# Patient Record
Sex: FEMALE | URBAN - METROPOLITAN AREA
[De-identification: names, ages, dates, MRNs, and addresses within clinical notes are randomized per-mention and may not be internally consistent; named-entity substitution may affect disease eponyms.]

---

## 2018-05-01 ENCOUNTER — OUTPATIENT (OUTPATIENT)
Dept: OUTPATIENT SERVICES | Facility: HOSPITAL | Age: 73
LOS: 1 days | End: 2018-05-01
Payer: MEDICAID

## 2018-05-01 PROCEDURE — G9001: CPT

## 2018-05-16 ENCOUNTER — EMERGENCY (EMERGENCY)
Facility: HOSPITAL | Age: 73
LOS: 1 days | Discharge: HOME | End: 2018-05-16
Attending: EMERGENCY MEDICINE
Payer: MEDICARE

## 2018-05-16 VITALS
WEIGHT: 149.91 LBS | OXYGEN SATURATION: 98 % | SYSTOLIC BLOOD PRESSURE: 149 MMHG | HEART RATE: 79 BPM | TEMPERATURE: 98 F | DIASTOLIC BLOOD PRESSURE: 88 MMHG | HEIGHT: 60 IN | RESPIRATION RATE: 16 BRPM

## 2018-05-16 VITALS
DIASTOLIC BLOOD PRESSURE: 64 MMHG | OXYGEN SATURATION: 98 % | TEMPERATURE: 98 F | HEART RATE: 64 BPM | SYSTOLIC BLOOD PRESSURE: 138 MMHG | RESPIRATION RATE: 16 BRPM

## 2018-05-16 LAB
ALBUMIN SERPL ELPH-MCNC: 3.7 G/DL — SIGNIFICANT CHANGE UP (ref 3.5–5)
ALP SERPL-CCNC: 52 U/L — SIGNIFICANT CHANGE UP (ref 40–120)
ALT FLD-CCNC: 52 U/L DA — SIGNIFICANT CHANGE UP (ref 10–60)
ANION GAP SERPL CALC-SCNC: 11 MMOL/L — SIGNIFICANT CHANGE UP (ref 5–17)
APPEARANCE UR: CLEAR — SIGNIFICANT CHANGE UP
APTT BLD: 43.2 SEC — HIGH (ref 27.5–37.4)
AST SERPL-CCNC: 35 U/L — SIGNIFICANT CHANGE UP (ref 10–40)
BASOPHILS # BLD AUTO: 0.1 K/UL — SIGNIFICANT CHANGE UP (ref 0–0.2)
BASOPHILS NFR BLD AUTO: 0.8 % — SIGNIFICANT CHANGE UP (ref 0–2)
BILIRUB SERPL-MCNC: 0.4 MG/DL — SIGNIFICANT CHANGE UP (ref 0.2–1.2)
BILIRUB UR-MCNC: NEGATIVE — SIGNIFICANT CHANGE UP
BUN SERPL-MCNC: 21 MG/DL — HIGH (ref 7–18)
CALCIUM SERPL-MCNC: 8.9 MG/DL — SIGNIFICANT CHANGE UP (ref 8.4–10.5)
CHLORIDE SERPL-SCNC: 111 MMOL/L — HIGH (ref 96–108)
CO2 SERPL-SCNC: 20 MMOL/L — LOW (ref 22–31)
COLOR SPEC: YELLOW — SIGNIFICANT CHANGE UP
CREAT SERPL-MCNC: 0.92 MG/DL — SIGNIFICANT CHANGE UP (ref 0.5–1.3)
DIFF PNL FLD: ABNORMAL
EOSINOPHIL # BLD AUTO: 0.1 K/UL — SIGNIFICANT CHANGE UP (ref 0–0.5)
EOSINOPHIL NFR BLD AUTO: 1 % — SIGNIFICANT CHANGE UP (ref 0–6)
GLUCOSE SERPL-MCNC: 128 MG/DL — HIGH (ref 70–99)
GLUCOSE UR QL: NEGATIVE — SIGNIFICANT CHANGE UP
HCT VFR BLD CALC: 37.2 % — SIGNIFICANT CHANGE UP (ref 34.5–45)
HGB BLD-MCNC: 12.1 G/DL — SIGNIFICANT CHANGE UP (ref 11.5–15.5)
INR BLD: 1.13 RATIO — SIGNIFICANT CHANGE UP (ref 0.88–1.16)
KETONES UR-MCNC: NEGATIVE — SIGNIFICANT CHANGE UP
LEUKOCYTE ESTERASE UR-ACNC: NEGATIVE — SIGNIFICANT CHANGE UP
LIDOCAIN IGE QN: 200 U/L — SIGNIFICANT CHANGE UP (ref 73–393)
LYMPHOCYTES # BLD AUTO: 2 K/UL — SIGNIFICANT CHANGE UP (ref 1–3.3)
LYMPHOCYTES # BLD AUTO: 23.5 % — SIGNIFICANT CHANGE UP (ref 13–44)
MCHC RBC-ENTMCNC: 32.4 GM/DL — SIGNIFICANT CHANGE UP (ref 32–36)
MCHC RBC-ENTMCNC: 33.2 PG — SIGNIFICANT CHANGE UP (ref 27–34)
MCV RBC AUTO: 102.4 FL — HIGH (ref 80–100)
MONOCYTES # BLD AUTO: 0.6 K/UL — SIGNIFICANT CHANGE UP (ref 0–0.9)
MONOCYTES NFR BLD AUTO: 6.7 % — SIGNIFICANT CHANGE UP (ref 2–14)
NEUTROPHILS # BLD AUTO: 5.8 K/UL — SIGNIFICANT CHANGE UP (ref 1.8–7.4)
NEUTROPHILS NFR BLD AUTO: 68 % — SIGNIFICANT CHANGE UP (ref 43–77)
NITRITE UR-MCNC: NEGATIVE — SIGNIFICANT CHANGE UP
PH UR: 5 — SIGNIFICANT CHANGE UP (ref 5–8)
PLATELET # BLD AUTO: 182 K/UL — SIGNIFICANT CHANGE UP (ref 150–400)
POTASSIUM SERPL-MCNC: 4.3 MMOL/L — SIGNIFICANT CHANGE UP (ref 3.5–5.3)
POTASSIUM SERPL-SCNC: 4.3 MMOL/L — SIGNIFICANT CHANGE UP (ref 3.5–5.3)
PROT SERPL-MCNC: 7 G/DL — SIGNIFICANT CHANGE UP (ref 6–8.3)
PROT UR-MCNC: NEGATIVE — SIGNIFICANT CHANGE UP
PROTHROM AB SERPL-ACNC: 12.3 SEC — SIGNIFICANT CHANGE UP (ref 9.8–12.7)
RBC # BLD: 3.63 M/UL — LOW (ref 3.8–5.2)
RBC # FLD: 11.3 % — SIGNIFICANT CHANGE UP (ref 10.3–14.5)
SODIUM SERPL-SCNC: 142 MMOL/L — SIGNIFICANT CHANGE UP (ref 135–145)
SP GR SPEC: 1.01 — SIGNIFICANT CHANGE UP (ref 1.01–1.02)
TROPONIN I SERPL-MCNC: <0.015 NG/ML — SIGNIFICANT CHANGE UP (ref 0–0.04)
UROBILINOGEN FLD QL: NEGATIVE — SIGNIFICANT CHANGE UP
WBC # BLD: 8.6 K/UL — SIGNIFICANT CHANGE UP (ref 3.8–10.5)
WBC # FLD AUTO: 8.6 K/UL — SIGNIFICANT CHANGE UP (ref 3.8–10.5)

## 2018-05-16 PROCEDURE — 85730 THROMBOPLASTIN TIME PARTIAL: CPT

## 2018-05-16 PROCEDURE — 74177 CT ABD & PELVIS W/CONTRAST: CPT | Mod: 26

## 2018-05-16 PROCEDURE — 81001 URINALYSIS AUTO W/SCOPE: CPT

## 2018-05-16 PROCEDURE — 84484 ASSAY OF TROPONIN QUANT: CPT

## 2018-05-16 PROCEDURE — 99285 EMERGENCY DEPT VISIT HI MDM: CPT

## 2018-05-16 PROCEDURE — 83690 ASSAY OF LIPASE: CPT

## 2018-05-16 PROCEDURE — 85610 PROTHROMBIN TIME: CPT

## 2018-05-16 PROCEDURE — 85027 COMPLETE CBC AUTOMATED: CPT

## 2018-05-16 PROCEDURE — 99284 EMERGENCY DEPT VISIT MOD MDM: CPT | Mod: 25

## 2018-05-16 PROCEDURE — 93005 ELECTROCARDIOGRAM TRACING: CPT

## 2018-05-16 PROCEDURE — 80053 COMPREHEN METABOLIC PANEL: CPT

## 2018-05-16 PROCEDURE — 74177 CT ABD & PELVIS W/CONTRAST: CPT

## 2018-05-16 RX ORDER — SODIUM CHLORIDE 9 MG/ML
3 INJECTION INTRAMUSCULAR; INTRAVENOUS; SUBCUTANEOUS ONCE
Qty: 0 | Refills: 0 | Status: COMPLETED | OUTPATIENT
Start: 2018-05-16 | End: 2018-05-16

## 2018-05-16 RX ORDER — SODIUM CHLORIDE 9 MG/ML
1000 INJECTION INTRAMUSCULAR; INTRAVENOUS; SUBCUTANEOUS ONCE
Qty: 0 | Refills: 0 | Status: COMPLETED | OUTPATIENT
Start: 2018-05-16 | End: 2018-05-16

## 2018-05-16 RX ORDER — SODIUM CHLORIDE 9 MG/ML
1000 INJECTION INTRAMUSCULAR; INTRAVENOUS; SUBCUTANEOUS
Qty: 0 | Refills: 0 | Status: DISCONTINUED | OUTPATIENT
Start: 2018-05-16 | End: 2018-05-20

## 2018-05-16 RX ADMIN — SODIUM CHLORIDE 1000 MILLILITER(S): 9 INJECTION INTRAMUSCULAR; INTRAVENOUS; SUBCUTANEOUS at 03:47

## 2018-05-16 RX ADMIN — SODIUM CHLORIDE 3 MILLILITER(S): 9 INJECTION INTRAMUSCULAR; INTRAVENOUS; SUBCUTANEOUS at 03:22

## 2018-05-16 RX ADMIN — SODIUM CHLORIDE 125 MILLILITER(S): 9 INJECTION INTRAMUSCULAR; INTRAVENOUS; SUBCUTANEOUS at 05:35

## 2018-05-16 NOTE — ED PROVIDER NOTE - NEURO NEGATIVE STATEMENT, MLM
"Raman Mccann is a 73 y.o. male is here for Dm2, HTN, hyperlipidemia, hypogonadism and Vit D deficicency. Lab review. Patient does not test BG. He is not taking Vascepa or Tradjenta. Patient denies any issues or concerns. /76  Ht 67\" (170.2 cm)  Wt 189 lb 12.8 oz (86.1 kg)  BMI 29.73 kg/m2.  Allergies   Allergen Reactions   • Statins Other (See Comments)     Cramps,joint pain  Cramps,joint pain  Cramps,joint pain       Current Outpatient Prescriptions:   •  allopurinol (ZYLOPRIM) 100 MG tablet, Take 1 tablet by mouth Daily., Disp: 90 tablet, Rfl: 3  •  CIALIS 5 MG tablet, TAKE 1 TABLET BY MOUTH DAILY AS NEEDED FOR ERECTILE DYSFUNCTION, Disp: 90 tablet, Rfl: 0  •  doxylamine (UNISOM) 25 MG tablet, Take 1 tablet by mouth nightly., Disp: , Rfl:   •  ergocalciferol (ERGOCALCIFEROL) 05172 UNITS capsule, Take 1 capsule by mouth 1 (One) Time Per Week., Disp: 13 capsule, Rfl: 3  •  fenofibrate (TRICOR) 145 MG tablet, Take 1 tablet by mouth Daily., Disp: 30 tablet, Rfl: 11  •  INVOKANA 100 MG tablet, Take 1 tablet every morning, Disp: 30 tablet, Rfl: 5  •  Multiple Vitamins-Minerals (MULTI VITAMIN/MINERALS) tablet, Take 1 tablet by mouth daily., Disp: , Rfl:   •  Olmesartan-Amlodipine-HCTZ 40-10-25 MG tablet, Take 1 tablet by mouth daily., Disp: 30 tablet, Rfl: 5  •  testosterone (ANDRODERM) 4 MG/24HR patch 24 hour 24 hour patch, Place 1 patch on the skin Every Night., Disp: 30 patch, Rfl: 5  •  ZETIA 10 MG tablet, Take 1 tablet by mouth Daily., Disp: 90 tablet, Rfl: 3  •  TRADJENTA 5 MG tablet tablet, Take 1 tablet by mouth Daily., Disp: 30 tablet, Rfl: 5  •  VASCEPA 1 G capsule capsule, Take 4 g by mouth Daily., Disp: 120 capsule, Rfl: 5      History of Present Illness this is a 73-year-old gentleman known patient with type II diabetes hypertension and dyslipidemia as well as hypogonadism and erectile dysfunction with hyperuricemia.  Over the course of last 6 months he has had no significant health " problems for which to go to emergency room or hospital.    The following portions of the patient's history were reviewed and updated as appropriate: allergies, current medications, past family history, past medical history, past social history, past surgical history and problem list.    Review of Systems   Constitutional: Negative for fatigue.   HENT: Negative for trouble swallowing.    Eyes: Negative for visual disturbance.   Respiratory: Negative for shortness of breath.    Cardiovascular: Negative for leg swelling.   Endocrine: Negative for polyphagia.   Skin: Negative for wound.   Neurological: Negative for numbness.       Objective   Physical Exam   Constitutional: He is oriented to person, place, and time. He appears well-developed and well-nourished. No distress.   HENT:   Head: Normocephalic and atraumatic.   Right Ear: External ear normal.   Left Ear: External ear normal.   Nose: Nose normal.   Mouth/Throat: Oropharynx is clear and moist. No oropharyngeal exudate.   Eyes: Conjunctivae and EOM are normal. Pupils are equal, round, and reactive to light. Right eye exhibits no discharge. Left eye exhibits no discharge. No scleral icterus.   Neck: Normal range of motion. Neck supple. No JVD present. No tracheal deviation present. No thyromegaly present.   Cardiovascular: Normal rate, regular rhythm, normal heart sounds and intact distal pulses.  Exam reveals no gallop and no friction rub.    No murmur heard.  Pulmonary/Chest: Effort normal and breath sounds normal. No stridor. No respiratory distress. He has no wheezes. He has no rales. He exhibits no tenderness.   Abdominal: Soft. Bowel sounds are normal. He exhibits no distension and no mass. There is no tenderness. There is no rebound and no guarding. No hernia.   Musculoskeletal: Normal range of motion. He exhibits no edema, tenderness or deformity.   Lymphadenopathy:     He has no cervical adenopathy.   Neurological: He is alert and oriented to person,  place, and time. He has normal reflexes. He displays normal reflexes. No cranial nerve deficit. He exhibits normal muscle tone. Coordination normal.   Skin: Skin is warm. No rash noted. He is not diaphoretic. No erythema. No pallor.   Psychiatric: He has a normal mood and affect. His behavior is normal. Judgment and thought content normal.   Nursing note and vitals reviewed.        Assessment/Plan   Diagnoses and all orders for this visit:    Controlled type 2 diabetes mellitus without complication, without long-term current use of insulin  -     T4 & TSH ([facility]); Future  -     Uric Acid; Future  -     TestT+TestF+SHBG; Future  -     Vitamin D 25 Hydroxy; Future  -     Comprehensive Metabolic Panel; Future  -     C-Peptide; Future  -     Hemoglobin A1c; Future  -     Lipid Panel; Future  -     MicroAlbumin, Urine, Random; Future  -     Hemoglobin & Hematocrit, Blood; Future    Vitamin D deficiency  -     T4 & TSH ([facility]); Future  -     Uric Acid; Future  -     TestT+TestF+SHBG; Future  -     Vitamin D 25 Hydroxy; Future  -     Comprehensive Metabolic Panel; Future  -     C-Peptide; Future  -     Hemoglobin A1c; Future  -     Lipid Panel; Future  -     MicroAlbumin, Urine, Random; Future  -     Hemoglobin & Hematocrit, Blood; Future  -     ZETIA 10 MG tablet; Take 1 tablet by mouth Daily.  -     Olmesartan-Amlodipine-HCTZ 40-10-25 MG tablet; Take 1 tablet by mouth Daily.  -     ergocalciferol (ERGOCALCIFEROL) 76989 units capsule; Take 1 capsule by mouth 1 (One) Time Per Week.  -     CIALIS 5 MG tablet; Take 1 tablet by mouth Daily As Needed for erectile dysfunction.  -     allopurinol (ZYLOPRIM) 100 MG tablet; Take 1 tablet by mouth Daily.    Low testosterone  -     T4 & TSH ([facility]); Future  -     Uric Acid; Future  -     TestT+TestF+SHBG; Future  -     Vitamin D 25 Hydroxy; Future  -     Comprehensive Metabolic Panel; Future  -     C-Peptide; Future  -     Hemoglobin A1c; Future  -     Lipid Panel; Future  -      MicroAlbumin, Urine, Random; Future  -     Hemoglobin & Hematocrit, Blood; Future  -     ZETIA 10 MG tablet; Take 1 tablet by mouth Daily.  -     Olmesartan-Amlodipine-HCTZ 40-10-25 MG tablet; Take 1 tablet by mouth Daily.  -     ergocalciferol (ERGOCALCIFEROL) 24916 units capsule; Take 1 capsule by mouth 1 (One) Time Per Week.  -     CIALIS 5 MG tablet; Take 1 tablet by mouth Daily As Needed for erectile dysfunction.  -     allopurinol (ZYLOPRIM) 100 MG tablet; Take 1 tablet by mouth Daily.    Essential hypertension  -     T4 & TSH ([facility]); Future  -     Uric Acid; Future  -     TestT+TestF+SHBG; Future  -     Vitamin D 25 Hydroxy; Future  -     Comprehensive Metabolic Panel; Future  -     C-Peptide; Future  -     Hemoglobin A1c; Future  -     Lipid Panel; Future  -     MicroAlbumin, Urine, Random; Future  -     Hemoglobin & Hematocrit, Blood; Future  -     ZETIA 10 MG tablet; Take 1 tablet by mouth Daily.  -     Olmesartan-Amlodipine-HCTZ 40-10-25 MG tablet; Take 1 tablet by mouth Daily.  -     ergocalciferol (ERGOCALCIFEROL) 63780 units capsule; Take 1 capsule by mouth 1 (One) Time Per Week.  -     CIALIS 5 MG tablet; Take 1 tablet by mouth Daily As Needed for erectile dysfunction.  -     allopurinol (ZYLOPRIM) 100 MG tablet; Take 1 tablet by mouth Daily.    Erectile dysfunction, unspecified erectile dysfunction type  -     T4 & TSH ([facility]); Future  -     Uric Acid; Future  -     TestT+TestF+SHBG; Future  -     Vitamin D 25 Hydroxy; Future  -     Comprehensive Metabolic Panel; Future  -     C-Peptide; Future  -     Hemoglobin A1c; Future  -     Lipid Panel; Future  -     MicroAlbumin, Urine, Random; Future  -     Hemoglobin & Hematocrit, Blood; Future    Dyslipidemia  -     T4 & TSH ([facility]); Future  -     Uric Acid; Future  -     TestT+TestF+SHBG; Future  -     Vitamin D 25 Hydroxy; Future  -     Comprehensive Metabolic Panel; Future  -     C-Peptide; Future  -     Hemoglobin A1c; Future  -     Lipid  Panel; Future  -     MicroAlbumin, Urine, Random; Future  -     Hemoglobin & Hematocrit, Blood; Future  -     ZETIA 10 MG tablet; Take 1 tablet by mouth Daily.  -     Olmesartan-Amlodipine-HCTZ 40-10-25 MG tablet; Take 1 tablet by mouth Daily.  -     ergocalciferol (ERGOCALCIFEROL) 46775 units capsule; Take 1 capsule by mouth 1 (One) Time Per Week.  -     CIALIS 5 MG tablet; Take 1 tablet by mouth Daily As Needed for erectile dysfunction.  -     allopurinol (ZYLOPRIM) 100 MG tablet; Take 1 tablet by mouth Daily.    Hyperuricemia  -     T4 & TSH ([facility]); Future  -     Uric Acid; Future  -     TestT+TestF+SHBG; Future  -     Vitamin D 25 Hydroxy; Future  -     Comprehensive Metabolic Panel; Future  -     C-Peptide; Future  -     Hemoglobin A1c; Future  -     Lipid Panel; Future  -     MicroAlbumin, Urine, Random; Future  -     Hemoglobin & Hematocrit, Blood; Future  -     ZETIA 10 MG tablet; Take 1 tablet by mouth Daily.  -     Olmesartan-Amlodipine-HCTZ 40-10-25 MG tablet; Take 1 tablet by mouth Daily.  -     ergocalciferol (ERGOCALCIFEROL) 12033 units capsule; Take 1 capsule by mouth 1 (One) Time Per Week.  -     CIALIS 5 MG tablet; Take 1 tablet by mouth Daily As Needed for erectile dysfunction.  -     allopurinol (ZYLOPRIM) 100 MG tablet; Take 1 tablet by mouth Daily.    Other male erectile dysfunction  -     ZETIA 10 MG tablet; Take 1 tablet by mouth Daily.  -     Olmesartan-Amlodipine-HCTZ 40-10-25 MG tablet; Take 1 tablet by mouth Daily.  -     ergocalciferol (ERGOCALCIFEROL) 70009 units capsule; Take 1 capsule by mouth 1 (One) Time Per Week.  -     CIALIS 5 MG tablet; Take 1 tablet by mouth Daily As Needed for erectile dysfunction.  -     allopurinol (ZYLOPRIM) 100 MG tablet; Take 1 tablet by mouth Daily.    Hyperglycemia  -     ZETIA 10 MG tablet; Take 1 tablet by mouth Daily.  -     Olmesartan-Amlodipine-HCTZ 40-10-25 MG tablet; Take 1 tablet by mouth Daily.  -     ergocalciferol (ERGOCALCIFEROL) 23336  units capsule; Take 1 capsule by mouth 1 (One) Time Per Week.  -     CIALIS 5 MG tablet; Take 1 tablet by mouth Daily As Needed for erectile dysfunction.  -     allopurinol (ZYLOPRIM) 100 MG tablet; Take 1 tablet by mouth Daily.    Other orders  -     INVOKANA 300 MG tablet; Take 300 mg by mouth Daily.  -     testosterone (ANDRODERM) 4 MG/24HR patch 24 hour 24 hour patch; Place 1 patch on the skin Every Night.  -     fenofibrate (TRICOR) 145 MG tablet; Take 1 tablet by mouth Daily.               In summary I saw and examined the 73-year-old gentleman for above-mentioned problems.  I reviewed his laboratory evaluation of 07/31/2017 and provided him with a hard copy of it.  Overall he is clinically and metabolically stable and therefore we will go ahead and continue all his current prescriptions.  I will see him in 6 months or sooner if needed with laboratory evaluation prior to each office visit.   no loss of consciousness, no gait abnormality, no headache, no sensory deficits, and no weakness.

## 2018-05-16 NOTE — ED PROVIDER NOTE - MEDICAL DECISION MAKING DETAILS
7:05a-Pt feels better, no guarding to repeated abdominal palpation. Pt's son states pt with chronic hx of hematouria and will f/u with urologist. Pt is well appearing walking with normal gait, stable for discharge and follow up with medical doctor. Pt educated on care and need for follow up. Discussed anticipatory guidance and return precautions. Questions answered. I had a detailed discussion with the patient and/or guardian regarding the historical points, exam findings, and any diagnostic results supporting the discharge diagnosis.

## 2018-05-16 NOTE — ED PROVIDER NOTE - OBJECTIVE STATEMENT
Son is requested [facility] .  Pt with diffuse abdominal pain and distention since yesterday. No vomiting, no chest pain, no shortness of breath .  Denies recent outside [place] travels, sick contacts or known spoiled food consumption.  meds: Son is requested [facility] .  Pt with diffuse abdominal pain and distention since yesterday. No vomiting, no chest pain, no shortness of breath .  Denies recent outside [place] travels, sick contacts or known spoiled food consumption.  meds: Pradaxa, Metformin, Atenolol, Trigintal, simvastatin, ASA  PMD : Dr. Luong

## 2018-05-16 NOTE — ED ADULT TRIAGE NOTE - CHIEF COMPLAINT QUOTE
c/o abdominal pain x a couple of days only during the night on the day time has no pain as per patient

## 2018-05-17 DIAGNOSIS — R69 ILLNESS, UNSPECIFIED: ICD-10-CM
